# Patient Record
Sex: MALE | Race: BLACK OR AFRICAN AMERICAN | NOT HISPANIC OR LATINO | Employment: FULL TIME | URBAN - METROPOLITAN AREA
[De-identification: names, ages, dates, MRNs, and addresses within clinical notes are randomized per-mention and may not be internally consistent; named-entity substitution may affect disease eponyms.]

---

## 2024-09-02 ENCOUNTER — HOSPITAL ENCOUNTER (EMERGENCY)
Facility: OTHER | Age: 39
Discharge: HOME | End: 2024-09-02
Attending: EMERGENCY MEDICINE
Payer: MEDICAID

## 2024-09-02 VITALS
HEIGHT: 69 IN | TEMPERATURE: 99 F | RESPIRATION RATE: 17 BRPM | WEIGHT: 175 LBS | SYSTOLIC BLOOD PRESSURE: 115 MMHG | HEART RATE: 98 BPM | OXYGEN SATURATION: 95 % | DIASTOLIC BLOOD PRESSURE: 60 MMHG | BODY MASS INDEX: 25.92 KG/M2

## 2024-09-02 DIAGNOSIS — R07.9 CHEST PAIN: ICD-10-CM

## 2024-09-02 DIAGNOSIS — R07.9 CHEST PAIN, UNSPECIFIED TYPE: Primary | ICD-10-CM

## 2024-09-02 DIAGNOSIS — F10.920 ALCOHOLIC INTOXICATION WITHOUT COMPLICATION: ICD-10-CM

## 2024-09-02 LAB
ANION GAP SERPL CALC-SCNC: 18 MMOL/L (ref 8–16)
BASOPHILS # BLD AUTO: 0.03 K/UL (ref 0–0.2)
BASOPHILS NFR BLD: 0.5 % (ref 0–1.9)
BUN SERPL-MCNC: 13 MG/DL (ref 6–20)
CALCIUM SERPL-MCNC: 9.7 MG/DL (ref 8.7–10.5)
CHLORIDE SERPL-SCNC: 106 MMOL/L (ref 95–110)
CO2 SERPL-SCNC: 18 MMOL/L (ref 23–29)
CREAT SERPL-MCNC: 1 MG/DL (ref 0.5–1.4)
D DIMER PPP IA.FEU-MCNC: 0.34 MG/L FEU
DIFFERENTIAL METHOD BLD: ABNORMAL
EOSINOPHIL # BLD AUTO: 0.1 K/UL (ref 0–0.5)
EOSINOPHIL NFR BLD: 1.3 % (ref 0–8)
ERYTHROCYTE [DISTWIDTH] IN BLOOD BY AUTOMATED COUNT: 13.2 % (ref 11.5–14.5)
EST. GFR  (NO RACE VARIABLE): >60 ML/MIN/1.73 M^2
ETHANOL SERPL-MCNC: 275 MG/DL
GLUCOSE SERPL-MCNC: 76 MG/DL (ref 70–110)
HCT VFR BLD AUTO: 42.7 % (ref 40–54)
HCV AB SERPL QL IA: NEGATIVE
HGB BLD-MCNC: 14.7 G/DL (ref 14–18)
HIV 1+2 AB+HIV1 P24 AG SERPL QL IA: NEGATIVE
IMM GRANULOCYTES # BLD AUTO: 0 K/UL (ref 0–0.04)
IMM GRANULOCYTES NFR BLD AUTO: 0 % (ref 0–0.5)
LYMPHOCYTES # BLD AUTO: 3 K/UL (ref 1–4.8)
LYMPHOCYTES NFR BLD: 47.7 % (ref 18–48)
MCH RBC QN AUTO: 32.5 PG (ref 27–31)
MCHC RBC AUTO-ENTMCNC: 34.4 G/DL (ref 32–36)
MCV RBC AUTO: 95 FL (ref 82–98)
MONOCYTES # BLD AUTO: 0.5 K/UL (ref 0.3–1)
MONOCYTES NFR BLD: 7.2 % (ref 4–15)
NEUTROPHILS # BLD AUTO: 2.8 K/UL (ref 1.8–7.7)
NEUTROPHILS NFR BLD: 43.3 % (ref 38–73)
NRBC BLD-RTO: 0 /100 WBC
OHS QRS DURATION: 102 MS
OHS QTC CALCULATION: 467 MS
PLATELET # BLD AUTO: 272 K/UL (ref 150–450)
PMV BLD AUTO: 8.9 FL (ref 9.2–12.9)
POTASSIUM SERPL-SCNC: 3.9 MMOL/L (ref 3.5–5.1)
RBC # BLD AUTO: 4.52 M/UL (ref 4.6–6.2)
SODIUM SERPL-SCNC: 142 MMOL/L (ref 136–145)
TROPONIN I SERPL DL<=0.01 NG/ML-MCNC: 0.01 NG/ML (ref 0–0.03)
TROPONIN I SERPL DL<=0.01 NG/ML-MCNC: 0.02 NG/ML (ref 0–0.03)
TROPONIN I SERPL DL<=0.01 NG/ML-MCNC: <0.006 NG/ML (ref 0–0.03)
WBC # BLD AUTO: 6.35 K/UL (ref 3.9–12.7)

## 2024-09-02 PROCEDURE — 93005 ELECTROCARDIOGRAM TRACING: CPT

## 2024-09-02 PROCEDURE — 85379 FIBRIN DEGRADATION QUANT: CPT | Performed by: EMERGENCY MEDICINE

## 2024-09-02 PROCEDURE — 82077 ASSAY SPEC XCP UR&BREATH IA: CPT | Performed by: EMERGENCY MEDICINE

## 2024-09-02 PROCEDURE — 84484 ASSAY OF TROPONIN QUANT: CPT | Performed by: EMERGENCY MEDICINE

## 2024-09-02 PROCEDURE — 93010 ELECTROCARDIOGRAM REPORT: CPT | Mod: ,,, | Performed by: INTERNAL MEDICINE

## 2024-09-02 PROCEDURE — 84484 ASSAY OF TROPONIN QUANT: CPT | Mod: 91 | Performed by: EMERGENCY MEDICINE

## 2024-09-02 PROCEDURE — 86803 HEPATITIS C AB TEST: CPT | Performed by: EMERGENCY MEDICINE

## 2024-09-02 PROCEDURE — 87389 HIV-1 AG W/HIV-1&-2 AB AG IA: CPT | Performed by: EMERGENCY MEDICINE

## 2024-09-02 PROCEDURE — 80048 BASIC METABOLIC PNL TOTAL CA: CPT | Performed by: EMERGENCY MEDICINE

## 2024-09-02 PROCEDURE — 99284 EMERGENCY DEPT VISIT MOD MDM: CPT | Mod: 25

## 2024-09-02 PROCEDURE — 85025 COMPLETE CBC W/AUTO DIFF WBC: CPT | Performed by: EMERGENCY MEDICINE

## 2024-09-02 NOTE — DISCHARGE INSTRUCTIONS
Take 81mg of over the counter enteric coated aspirin daily. Return to the hospital for new or worsening symptoms or chest pain.  Followup with a cardiologist and primary care doctor.

## 2024-09-02 NOTE — ED TRIAGE NOTES
Aaron Wall, a 39 y.o. male presents to the ED c/o anxiety and chest pain that began 45 minutes ago.    Patient is A&Ox4. Denies any other complaints. ED workup in progress. Safety measures in place; side rails up x2. Call light within pt reach. Plan of care ongoing.    Chief Complaint   Patient presents with    Anxiety     Patient presents to the ED via [facility] with reports of having had a panic attack and chest pain that started tonight. Patient states pain is 8/10 and has improved since being with EMS. Patient refused and pre-arrival treatment from EMS.     Chest Pain

## 2024-09-02 NOTE — ED PROVIDER NOTES
Encounter Date: 2024       History     Chief Complaint   Patient presents with    Anxiety     Patient presents to the ED via [facility] with reports of having had a panic attack and chest pain that started tonight. Patient states pain is 8/10 and has improved since being with EMS. Patient refused and pre-arrival treatment from EMS.     Chest Pain     39-year-old male with history of seizure disorder presents for evaluation after feeling as though he might have a seizure.  He states he was talking to his wife less than 1 hour ago when she became upset with him.  At that point he developed pain in his head which travel down into his chest.  He describes the headache as a migraine and the chest discomfort as pressure in the center of his chest.  Both of the symptoms have resolved.  He currently is only complaining of feeling anxious.  He does admit to drinking alcohol tonight but denies any other drug use.  He denies any history of a PE/DVT, prolonged immobilization, asymmetric leg swelling or pain, family history of cardiac disease before the age of 65, and smoking history.      Review of patient's allergies indicates:  No Known Allergies  Past Medical History:   Diagnosis Date    Epilepsy      History reviewed. No pertinent surgical history.  No family history on file.  Social History     Tobacco Use    Smoking status: Former     Current packs/day: 0.00     Average packs/day: 1 pack/day for 15.0 years (15.0 ttl pk-yrs)     Types: Cigars, Cigarettes     Start date:      Quit date: 2019     Years since quittin.6   Substance Use Topics    Alcohol use: Yes     Comment: socially    Drug use: No     Review of Systems    Physical Exam     Initial Vitals [24 0318]   BP Pulse Resp Temp SpO2   (!) 139/104 (!) 125 20 98.6 °F (37 °C) 95 %      MAP       --         Physical Exam    Nursing note and vitals reviewed.  Constitutional: He appears well-developed and well-nourished. He is not diaphoretic. No  distress.   HENT:   Head: Normocephalic and atraumatic.   Right Ear: External ear normal.   Left Ear: External ear normal.   Nose: Nose normal.   Mouth/Throat: No oropharyngeal exudate.   Eyes: Conjunctivae and EOM are normal. Right eye exhibits no discharge. Left eye exhibits no discharge.   Neck: Neck supple.   Normal range of motion.  Cardiovascular:  Regular rhythm.     Exam reveals no gallop and no friction rub.       No murmur heard.  Tachycardic, regular   Pulmonary/Chest: Breath sounds normal. No stridor. No respiratory distress. He has no wheezes. He has no rhonchi. He has no rales.   Diffuse chest wall tenderness to palpation   Abdominal: Abdomen is soft. Bowel sounds are normal.   Musculoskeletal:         General: Normal range of motion.      Cervical back: Normal range of motion and neck supple.     Neurological: He is alert and oriented to person, place, and time.   Psychiatric:   Tearful, anxious         ED Course   Procedures  Labs Reviewed   ALCOHOL,MEDICAL (ETHANOL) - Abnormal       Result Value    Alcohol, Serum 275 (*)     Narrative:     Release to patient->Immediate   CBC W/ AUTO DIFFERENTIAL - Abnormal    WBC 6.35      RBC 4.52 (*)     Hemoglobin 14.7      Hematocrit 42.7      MCV 95      MCH 32.5 (*)     MCHC 34.4      RDW 13.2      Platelets 272      MPV 8.9 (*)     Immature Granulocytes 0.0      Gran # (ANC) 2.8      Immature Grans (Abs) 0.00      Lymph # 3.0      Mono # 0.5      Eos # 0.1      Baso # 0.03      nRBC 0      Gran % 43.3      Lymph % 47.7      Mono % 7.2      Eosinophil % 1.3      Basophil % 0.5      Differential Method Automated      Narrative:     Release to patient->Immediate   BASIC METABOLIC PANEL - Abnormal    Sodium 142      Potassium 3.9      Chloride 106      CO2 18 (*)     Glucose 76      BUN 13      Creatinine 1.0      Calcium 9.7      Anion Gap 18 (*)     eGFR >60      Narrative:     Release to patient->Immediate   TROPONIN I    Troponin I <0.006      Narrative:      Release to patient->Immediate   D DIMER, QUANTITATIVE    D-Dimer 0.34      Narrative:     Release to patient->Immediate   HIV 1 / 2 ANTIBODY    HIV 1/2 Ag/Ab Negative      Narrative:     Release to patient->Immediate   HEPATITIS C ANTIBODY    Hepatitis C Ab Negative      Narrative:     Release to patient->Immediate   TROPONIN I    Troponin I 0.017     TROPONIN I    Troponin I 0.011       EKG Readings: (Independently Interpreted)   Sinus tachycardia, heart rate 111, biphasic T-waves in leads 3 and AVF.     ECG Results              EKG 12-lead (Final result)        Collection Time Result Time QRS Duration [facility] QTC Calculation    09/02/24 03:25:21 09/02/24 14:27:02 102 467                     Final result by Interface, Lab In [facility] (09/02/24 14:27:11)                   Narrative:    Test Reason : R07.9,    Vent. Rate : 111 BPM     Atrial Rate : 111 BPM     P-R Int : 156 ms          QRS Dur : 102 ms      QT Int : 344 ms       P-R-T Axes : 073 093 -11 degrees     QTc Int : 467 ms    Sinus tachycardia  Right atrial enlargement  Rightward axis  T wave abnormality, consider inferior ischemia  Abnormal ECG    Confirmed by Jeannine CHU, Doyle CORREIA ([id number]) on 9/2/2024 2:27:00 PM    Referred By: [email or web address]   SELF           Confirmed By:Doyle Paez MD                                  Imaging Results    None          Medications - No data to display  Medical Decision Making  39-year-old male with history of seizure disorder presented with concern that he may have a seizure after experiencing a headache that migrated down into his chest.  The time of my evaluation the headache had resolved but he still had persistent chest discomfort.  Initial EKG without acute ischemic changes.  Initial troponin normal.  Heart score 0.  Will plan to repeat 3 hour troponin.  Patient was care will be transferred to 6:00 a.m. physician at this time.    Amount and/or Complexity of Data Reviewed  Labs: ordered.                                       Clinical Impression:  Final diagnoses:  [R07.9] Chest pain, unspecified type (Primary)  [F10.920] Alcoholic intoxication without complication          ED Disposition Condition    Discharge Stable          ED Prescriptions    None       Follow-up Information       Follow up With Specialties Details Why Contact Info    primary care  Schedule an appointment as soon as possible for a visit                Jessica Kelly MD  09/02/24 0212

## 2024-11-27 ENCOUNTER — PATIENT MESSAGE (OUTPATIENT)
Dept: RESEARCH | Facility: HOSPITAL | Age: 39
End: 2024-11-27
Payer: MEDICAID